# Patient Record
Sex: MALE | Race: WHITE
[De-identification: names, ages, dates, MRNs, and addresses within clinical notes are randomized per-mention and may not be internally consistent; named-entity substitution may affect disease eponyms.]

---

## 2019-06-30 ENCOUNTER — HOSPITAL ENCOUNTER (EMERGENCY)
Dept: HOSPITAL 26 - MED | Age: 44
Discharge: HOME | End: 2019-06-30
Payer: SELF-PAY

## 2019-06-30 VITALS — DIASTOLIC BLOOD PRESSURE: 91 MMHG | SYSTOLIC BLOOD PRESSURE: 148 MMHG

## 2019-06-30 VITALS — BODY MASS INDEX: 31.52 KG/M2 | WEIGHT: 208 LBS | HEIGHT: 68 IN

## 2019-06-30 DIAGNOSIS — S83.92XA: Primary | ICD-10-CM
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
## 2019-06-30 NOTE — NUR
43 Y MALE BIB WIFE C/O LEFT KNEE PAIN. DENIES TRAUMA OR INJURY. PT SEEN AT 
CLINIC ON TUESDAY AND RX GIVEN FOR NAPROSYN WITH NO RELIEF. NO XRAY TAKEN. +ROM 
WITH PAIN. SITE TENDER TO TOUCH. +LEFT PEDAL PULSE. +CAP REFILL <3 SECONDS. VSS 
AT THIS TIME. PT [id number]. PAIN 9/10. BED IS DOWN, LOCKED, BED RAIL X 1, ERMD TO 
SEE PT.

MED HX: DM

## 2019-06-30 NOTE — NUR
Written and verbal after care instructions given and explained BY DR JAMA.

Patient alert AND oriented. PATIENT Ambulatory with CRUTCHES. ID band removed. 
Rx of NAPROSYN given.

## 2019-06-30 NOTE — NUR
ACE WRAP APPLIED TO PTS LEFT KNEE BY RODRIGUEZ EMT. +CAP REFILL <3 SECONDS ON L 
FOOT. +LEFT PEDAL PULSE.

## 2021-10-06 NOTE — NUR
-------------------------------------------------------------------------------

            *** Note undone in ED - 06/30/19 at 2015 by YOLANDA ***            

-------------------------------------------------------------------------------

Dr. Chen examining patient. LM to check in on patient to see if he has any questions prior to his procedure tomorrow and to make sure he has been holding his eliquis as instructed. Left message on both lines. -aubree